# Patient Record
Sex: MALE
[De-identification: names, ages, dates, MRNs, and addresses within clinical notes are randomized per-mention and may not be internally consistent; named-entity substitution may affect disease eponyms.]

---

## 2022-06-14 PROBLEM — Z00.00 ENCOUNTER FOR PREVENTIVE HEALTH EXAMINATION: Status: ACTIVE | Noted: 2022-06-14

## 2022-07-05 ENCOUNTER — APPOINTMENT (OUTPATIENT)
Dept: OTOLARYNGOLOGY | Facility: CLINIC | Age: 82
End: 2022-07-05

## 2022-07-05 ENCOUNTER — LABORATORY RESULT (OUTPATIENT)
Age: 82
End: 2022-07-05

## 2022-07-05 VITALS
HEIGHT: 64 IN | OXYGEN SATURATION: 98 % | HEART RATE: 62 BPM | WEIGHT: 155 LBS | DIASTOLIC BLOOD PRESSURE: 71 MMHG | SYSTOLIC BLOOD PRESSURE: 146 MMHG | BODY MASS INDEX: 26.46 KG/M2

## 2022-07-05 DIAGNOSIS — R22.1 LOCALIZED SWELLING, MASS AND LUMP, NECK: ICD-10-CM

## 2022-07-05 DIAGNOSIS — Z87.19 PERSONAL HISTORY OF OTHER DISEASES OF THE DIGESTIVE SYSTEM: ICD-10-CM

## 2022-07-05 DIAGNOSIS — Z78.9 OTHER SPECIFIED HEALTH STATUS: ICD-10-CM

## 2022-07-05 DIAGNOSIS — Z86.79 PERSONAL HISTORY OF OTHER DISEASES OF THE CIRCULATORY SYSTEM: ICD-10-CM

## 2022-07-05 DIAGNOSIS — R22.0 LOCALIZED SWELLING, MASS AND LUMP, HEAD: ICD-10-CM

## 2022-07-05 DIAGNOSIS — Z86.39 PERSONAL HISTORY OF OTHER ENDOCRINE, NUTRITIONAL AND METABOLIC DISEASE: ICD-10-CM

## 2022-07-05 PROCEDURE — 99204 OFFICE O/P NEW MOD 45 MIN: CPT | Mod: 25

## 2022-07-05 PROCEDURE — 76536 US EXAM OF HEAD AND NECK: CPT

## 2022-07-05 PROCEDURE — 10005 FNA BX W/US GDN 1ST LES: CPT

## 2022-07-05 PROCEDURE — 31575 DIAGNOSTIC LARYNGOSCOPY: CPT

## 2022-07-05 NOTE — PROCEDURE
[FreeTextEntry1] : US FNA R. submandibular mass [FreeTextEntry2] : R. submandibular mass [FreeTextEntry3] : After patient gave consent, the lesion was visualized with US with findings noted above.  The overlying skin was prepped with alcohol.  Under US guidance, a FNA was performed with multiple passes of a 22 gauge needle.  The specimen was sent to Cytology.  No hematoma.  Patient tolerated the procedure well. [Gag Reflex] : gag reflex preventing mirror examination [None] : none [Flexible Endoscope] : examined with the flexible endoscope [Serial Number: ___] : Serial Number: [unfilled] [de-identified] : No lesions in the NPx, OPx, HPx or larynx.  VC are mobile, airway patent.\par

## 2022-07-05 NOTE — CONSULT LETTER
[Dear  ___] : Dear ~MATTHEW, [Consult Letter:] : I had the pleasure of evaluating your patient, [unfilled]. [Please see my note below.] : Please see my note below. [Consult Closing:] : Thank you very much for allowing me to participate in the care of this patient.  If you have any questions, please do not hesitate to contact me. [Sincerely,] : Sincerely, [FreeTextEntry2] : Ms. Marija Reyes, RPA-C\[address]\[place] [address] [FreeTextEntry3] : Dev

## 2022-07-05 NOTE — HISTORY OF PRESENT ILLNESS
[de-identified] : 82 year old man, referred by ENT, Dr. Marija Reyes, for Right neck mass- submandibular gland\par States symptoms present for the past few years\par History of acid reflux, taking Famotidine\par Denies increase in size or swelling\par No pain/tenderness when palpated\par s/p CT Neck done at [facility]\par possible FNA?\par Denies dysphagia, odynophagia, dyspnea, dysphonia, hemoptysis, recent fevers, throat/oral infections\par No weight loss

## 2022-07-05 NOTE — PHYSICAL EXAM
Refill request  Metoprolol 25mg tab #180 with 0 refills   LOV 6/4/19  BP on 6/12/19, 108/64  BMP 3/20/19  Filled per protocol    [de-identified] : Firm mass in the right submandibular gland, approx. 3 cm, mobile, no TTP, no LAD. [Laryngoscopy Performed] : laryngoscopy was performed, see procedure section for findings [FreeTextEntry1] : No concerning lesions in the OC/OPx on inspection or palpation.\par  [Normal] : no rashes

## 2022-07-05 NOTE — DATA REVIEWED
[de-identified] : US today with mass in the R. submandibular gland measuring 3.20 x 2.14 x 2.28 cm.  No pathologic LAD is appreciated in the neck.

## 2022-07-18 ENCOUNTER — NON-APPOINTMENT (OUTPATIENT)
Age: 82
End: 2022-07-18